# Patient Record
Sex: FEMALE
[De-identification: names, ages, dates, MRNs, and addresses within clinical notes are randomized per-mention and may not be internally consistent; named-entity substitution may affect disease eponyms.]

---

## 2021-10-06 ENCOUNTER — HOSPITAL ENCOUNTER (INPATIENT)
Dept: HOSPITAL 46 - ED | Age: 31
LOS: 2 days | Discharge: HOME | DRG: 872 | End: 2021-10-08
Attending: INTERNAL MEDICINE | Admitting: INTERNAL MEDICINE
Payer: COMMERCIAL

## 2021-10-06 VITALS — WEIGHT: 171.3 LBS | HEIGHT: 64 IN | BODY MASS INDEX: 29.24 KG/M2

## 2021-10-06 DIAGNOSIS — A41.9: Primary | ICD-10-CM

## 2021-10-06 DIAGNOSIS — N10: ICD-10-CM

## 2021-10-06 DIAGNOSIS — Z20.822: ICD-10-CM

## 2021-10-06 DIAGNOSIS — R65.20: ICD-10-CM

## 2021-10-06 DIAGNOSIS — E87.6: ICD-10-CM

## 2021-10-06 PROCEDURE — U0003 INFECTIOUS AGENT DETECTION BY NUCLEIC ACID (DNA OR RNA); SEVERE ACUTE RESPIRATORY SYNDROME CORONAVIRUS 2 (SARS-COV-2) (CORONAVIRUS DISEASE [COVID-19]), AMPLIFIED PROBE TECHNIQUE, MAKING USE OF HIGH THROUGHPUT TECHNOLOGIES AS DESCRIBED BY CMS-2020-01-R: HCPCS

## 2021-10-06 PROCEDURE — C9803 HOPD COVID-19 SPEC COLLECT: HCPCS

## 2021-10-06 NOTE — NUR
IN TO START NEXT BAG OF POTASSIUM.  PT SLEEPING AT THIS TIME, NO APPARENT
DISTRESS, RESPIRATIONS EVEN AND UNLABORED.

## 2021-10-06 NOTE — NUR
CALL LIGHT ANSWERED, PT REPORTS BURNING IN IV SINCE STARTING POTASSIUM
INFUSION.  INFUSION RATE SLOWED AT THIS TIME.

## 2021-10-06 NOTE — NUR
PT ARRIVED TO ROOM 130 AT 2050 VIA STRETCHER, PT ABLE TO AMBULATE TO BATHROOM
TO VOID AND THEN INTO BED.  PT REPORTS 7/10 RIGHT FLANK PAIN, PRN TORADOL
GIVEN.  REMAINDER OF ASSESSMENT IS BENIGN-SEE DOCUMENTATION.  IV SITES INTACT
AND PATENT.  FLUIDS STARTED PER ORDERS.  PT PROVIDED WITH ICE WATER AND
EDUCATED ON BED CONTROLS AND CALL LIGHT.  NO FURTHER REQUESTS, CALL LIGHT
WITHIN REACH.

## 2021-10-06 NOTE — XMS
PreManage Notification: DAVID SALAZAR MRN:[id number]
 
Security Information
 
Security Events
No recent Security Events currently on file
 
 
 
CRITERIA MET
------------
- [facility] - 2 Visits in 30 Days
 
 
CARE PROVIDERS
-------------------------------------------------------------------------------------
CESAR MARVIN      Obstetrics \T\ Gynecology     Current
 
PHONE: [phone]
-------------------------------------------------------------------------------------
 
[facility] has no Care Guidelines for this patient.
 
DORON VISIT COUNT (12 MO.)
-------------------------------------------------------------------------------------
2 [facility]
-------------------------------------------------------------------------------------
TOTAL 2
-------------------------------------------------------------------------------------
NOTE: Visits indicate total known visits.
 
ED/UCC VISIT TRACKING (12 MO.)
-------------------------------------------------------------------------------------
10/06/2021 15:28
[facility] OR
 
TYPE: Emergency
 
COMPLAINT:
- FEVER,BACK PAIN,CHILLS,VOMITING
-------------------------------------------------------------------------------------
10/05/2021 16:30
[facility] OR
 
TYPE: Emergency
 
COMPLAINT:
- CHILLS, FREQUENT URINATION, BODY ACHES
-------------------------------------------------------------------------------------
 
 
INPATIENT VISIT TRACKING (12 MO.)
No inpatient visits to display in this time frame
 
[email or web address] FREE TEXT

## 2021-10-07 NOTE — NUR
Removed IV from left hand with RN supervising due to pt
complaining of pain at IV site. Otherwise IV site WNL. No redness, bleeding,
or signs of infiltration.

## 2021-10-07 NOTE — NUR
REPORT RECEIVED FROM NIGHT RN AND PT. CARE RESUMED. PT. IS DROWSY BUT EASILY
AWAKENS AND ORIENTED. SHE C/O MILD RIGHT BACK PAIN. LUNGS CLEAR THROUGHOUT AND
BOWEL TONES ACTIVE.  IN THE ROOM. IV SITE WNL AND IVF CHANGED. SHE WAS
ASSISTED WITH ORDERING BREAKFAST.
DISCUSSED POC, AMBULATION AND SAFETY.  LEFT RESTING WITH CALL LIGHT IN REACH.

## 2021-10-07 NOTE — NUR
CALL LIGHT ON. pt REPORTED 7/10 HEADACHE. PRN GIVEN. pt HAS COUGH, DIM LUNGS
IN THE BASES. ACTIVE BOWEL TONES. NO PAIN OTHER THAN HEADACHE. SET UP FOR
SHOWER. FRESH LINENS. PROVIDED WITH WATER. CALL LIGHT WITHIN REACH.

## 2021-10-07 NOTE — NUR
Patient had a temp. of 100.6, RN was notified. Patient is asking for warm
blankets and to have her bed changed after she gets warmed up around noon.
Call light is in reach.

## 2021-10-07 NOTE — NUR
IN TO START NEXT BAG OF POTASSIUM.  PT CONTINUES TO SLEEP, NO APPARENT
DISTRESS.  RESPIRATIONS EVEN AND UNLABORED, REMAINS ON ROOM AIR.  VITAL SIGNS
STABLE.  IV APPEARS INTACT, FLUIDS INFUSING WNL.  WILL ALLOW FOR REST AT THIS
TIME.

## 2021-10-07 NOTE — NUR
pt COMPLETED SHOWER, IV UNWRAPPED. COUGH MEDICATION GIVEN (SEE MAR). 
AT BEDSIDE. NO REQUESTS AT THIS TIME. CALL LIGHT WITHIN REACH.

## 2021-10-07 NOTE — NUR
CALL LIGHT ANSWERED.  PT UP TO BATHROOM INDEPENDENTLY ONCE CORDS WERE
UNPLUGGED, PT VOIDED AND RETURNED TO BED.  PT REPORTS 6/10 RIGHT FLANK PAIN,
PRN TORADOL GIVEN.  ASSESSMENT COMPLETED AND UNCHANGED.  PT PROVIDED WITH WARM
BLANKETS AND FRESH ICE WATER.  DENIES FURTHER NEEDS, CALL LIGHT WITHIN REACH.

## 2021-10-07 NOTE — NUR
RECEIVED REPORT FROM DILLON HELM. pt RESTING ON LEFT SIDE. LIGHTS OFF.
RESPIRATIONS REGULAR. WHITEBOARD UPDATED. CALL LIGHT WITHIN REACH.

## 2021-10-07 NOTE — NUR
STUDENT RN REPORTS PT. HAS 7/10 PAIN FROM HA. GIVEN TORODOL BY STUDENT AND
INSTRUCTOR
. PT. IS TOLERATING
REG. DIET AND DENIES NAUSEA. VITALS STABLE. PT. LEFT RESTING WITH CALL LIGHT
IN REACH.

## 2021-10-07 NOTE — NUR
PT UP TO BATHROOM, INDEPENDENT ONCE LINES UNPLUGGED.  VOIDED 650ML AND
RETURNED TO BED.  AFEBRILE.  REPORTS FLANK PAIN IS IMPROVED, CURRENTLY RATES
AS 4/10 AND TOLERABLE.  DENIES NEEDS, CALL LIGHT WITHIN REACH.

## 2021-10-08 NOTE — NUR
ALL DISCHARGE INSTRUCTIONS REVIEWED WITH PT. AND QUESTIONS ANSWERED. IV
REMOVED WITH CATH INTACT AND EDU PROVIDED. PT. LEFT VIA WHEELCHAIR WITH ALL
BELONGINGS WITH RN AND .

## 2021-10-08 NOTE — NUR
SPOKE WITH PATIENT AND  IN ROOM.  PATIENT WAS DRESSED AND IS BEING
DISCHARGED.  PATIENT DENIES ANY NEEDS TO GO HOME SAFELY.  DISCUSSED IMPORTANCE
OF PCP.  SHE STATES SHE COULD USE SOME HELP WITH FINDING SOMEONE.  DISCUSSED
WE CAN HELP WITH THIS.  PATIENT IS FROM [place], IS NOT SURE IF SHE WANTS
SOMEONE THERE OR HERE.  I WAS ASKED TO COME TALK TO ANOTHER PATIENT AND TOLD
HER WE CAN TALK LATER BEFORE SHE GOES.

## 2021-10-08 NOTE — NUR
IN TO DO ASSESSMENT. pt WOKE TO VOICE REPORTED HAVING SLEPT MOST OF THE NIGHT.
REQUESTED PAIN MEDICATION FOR 4/10 HEADACHE, DENIED COUGHING AFTER COUGH
MEDICATION LAST NIGHT. PRN GIVEN. ASSESSMENT DONE. NO FURTHER REQUESTS AT THIS
TIME.  AT BEDSIDE. CALL LIGHT WITHIN REACH.

## 2021-10-08 NOTE — EKG
[facility]
                                    [address]
                                  [place], [place]  [address]
_________________________________________________________________________________________
                                                                 Signed   
 
 
Sinus tachycardia
Left axis deviation
Pulmonary disease pattern
Abnormal ECG
No previous ECGs available
Confirmed by MARIO PHILLIPS DO ([id number]) on 10/8/2021 9:29:31 PM
 
 
 
 
 
 
 
 
 
 
 
 
 
 
 
 
 
 
 
 
 
 
 
 
 
 
 
 
 
 
 
 
 
 
 
 
 
 
 
    Electronically Signed By: MARIO PHILLIPS DO  10/08/21 2129
_________________________________________________________________________________________
PATIENT NAME:     DAVID SALAZAR                       
MEDICAL RECORD #: [id number]                     Electrocardiogram             
          ACCT #: [id number]  
DATE OF BIRTH:   [birth year]                                       
PHYSICIAN:   MARIO PHILLIPS DO                     REPORT #: [id number]
REPORT IS CONFIDENTIAL AND NOT TO BE RELEASED WITHOUT AUTHORIZATION

## 2021-10-08 NOTE — NUR
WHEN I CHECKED BACK PATIENT HAD ALREADY LEFT.  WILL HAVE [facility]W CALL NEXT WEEK TO
SEE IF WE CAN HELP FIND PCP.

## 2021-10-08 NOTE — NUR
Patient was sleeping and just started to eat her breakfast. Visitor is in the
room. VItals, I&Os are done.

## 2021-10-08 NOTE — NUR
REPORT RECEIVED FROM NIGHT RN AND PT. CARE RESUMED. PT. IS ALERT AND ORIENTED.
 IN THE ROOM. SHE DENIES PAIN OR NAUSEA AT THIS TIME. LUNGS CLEAR
THROUGHOUT. AFEBRILE. PT. 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
EATER.
DISCUSSED
POC AND MEDS. PT. LEFT RESTING IN BED WITH CALL LIGHT IN REACH.